# Patient Record
Sex: FEMALE | Race: BLACK OR AFRICAN AMERICAN | NOT HISPANIC OR LATINO | Employment: OTHER | RURAL
[De-identification: names, ages, dates, MRNs, and addresses within clinical notes are randomized per-mention and may not be internally consistent; named-entity substitution may affect disease eponyms.]

---

## 2021-03-22 DIAGNOSIS — M48.061 LUMBAR STENOSIS: Primary | ICD-10-CM

## 2021-03-30 DIAGNOSIS — M54.9 DORSALGIA, UNSPECIFIED: ICD-10-CM

## 2021-04-13 DIAGNOSIS — M54.9 DORSALGIA, UNSPECIFIED: ICD-10-CM

## 2021-04-13 RX ORDER — METHYLPREDNISOLONE 4 MG/1
TABLET ORAL
COMMUNITY
Start: 2021-03-22

## 2021-04-13 RX ORDER — CLONIDINE HYDROCHLORIDE 0.1 MG/1
0.1 TABLET ORAL 2 TIMES DAILY
COMMUNITY
Start: 2021-01-12

## 2021-04-13 RX ORDER — CALCIUM CITRATE/VITAMIN D3 200MG-6.25
TABLET ORAL
COMMUNITY
Start: 2021-04-01

## 2021-04-13 RX ORDER — LINACLOTIDE 145 UG/1
145 CAPSULE, GELATIN COATED ORAL DAILY
COMMUNITY
Start: 2021-03-07

## 2021-04-13 RX ORDER — INSULIN GLARGINE 100 [IU]/ML
INJECTION, SOLUTION SUBCUTANEOUS
COMMUNITY
Start: 2021-02-24

## 2021-04-13 RX ORDER — AMLODIPINE BESYLATE 10 MG/1
10 TABLET ORAL DAILY
COMMUNITY
Start: 2021-02-02

## 2021-04-13 RX ORDER — LOSARTAN POTASSIUM 100 MG/1
100 TABLET ORAL DAILY
COMMUNITY
Start: 2021-02-23

## 2021-04-13 RX ORDER — ONDANSETRON 4 MG/1
4 TABLET, FILM COATED ORAL 2 TIMES DAILY
COMMUNITY
Start: 2021-01-08

## 2021-04-13 RX ORDER — SYRINGE,SAFETY WITH NEEDLE,1ML 25GX1"
SYRINGE (EA) MISCELLANEOUS
COMMUNITY
Start: 2021-02-23

## 2021-04-13 RX ORDER — HYDROCODONE BITARTRATE AND ACETAMINOPHEN 10; 325 MG/1; MG/1
TABLET ORAL
COMMUNITY
Start: 2021-04-07

## 2021-04-13 RX ORDER — NAPROXEN SODIUM 220 MG
TABLET ORAL
COMMUNITY
Start: 2021-01-08

## 2021-04-13 RX ORDER — INSULIN ASPART 100 [IU]/ML
INJECTION, SOLUTION INTRAVENOUS; SUBCUTANEOUS
COMMUNITY
Start: 2021-03-07

## 2021-04-13 RX ORDER — PREGABALIN 200 MG/1
200 CAPSULE ORAL 2 TIMES DAILY
COMMUNITY
Start: 2021-04-02

## 2021-04-13 RX ORDER — MORPHINE SULFATE 15 MG/1
TABLET, FILM COATED, EXTENDED RELEASE ORAL
COMMUNITY
Start: 2021-04-09

## 2021-04-13 RX ORDER — BUSPIRONE HYDROCHLORIDE 15 MG/1
15 TABLET ORAL 3 TIMES DAILY
COMMUNITY
Start: 2021-04-07

## 2021-04-13 RX ORDER — METOPROLOL TARTRATE 50 MG/1
50 TABLET ORAL 2 TIMES DAILY
COMMUNITY
Start: 2021-03-01

## 2021-04-13 RX ORDER — TRIAMTERENE AND HYDROCHLOROTHIAZIDE 75; 50 MG/1; MG/1
1 TABLET ORAL DAILY
COMMUNITY
Start: 2021-04-07

## 2021-04-14 ENCOUNTER — OFFICE VISIT (OUTPATIENT)
Dept: SPINE | Facility: CLINIC | Age: 51
End: 2021-04-14
Payer: MEDICARE

## 2021-04-14 ENCOUNTER — HOSPITAL ENCOUNTER (OUTPATIENT)
Dept: RADIOLOGY | Facility: HOSPITAL | Age: 51
Discharge: HOME | End: 2021-04-14
Attending: ORTHOPAEDIC SURGERY
Payer: MEDICARE

## 2021-04-14 VITALS — WEIGHT: 280 LBS | BODY MASS INDEX: 51.53 KG/M2 | HEIGHT: 62 IN

## 2021-04-14 DIAGNOSIS — M54.12 CERVICAL RADICULOPATHY: ICD-10-CM

## 2021-04-14 DIAGNOSIS — M48.061 LUMBAR STENOSIS: ICD-10-CM

## 2021-04-14 DIAGNOSIS — M54.12 CERVICAL RADICULOPATHY: Primary | ICD-10-CM

## 2021-04-14 DIAGNOSIS — M54.9 DORSALGIA, UNSPECIFIED: ICD-10-CM

## 2021-04-14 PROCEDURE — 99204 PR OFFICE/OUTPT VISIT, NEW, LEVL IV, 45-59 MIN: ICD-10-PCS | Mod: S$PBB,,, | Performed by: ORTHOPAEDIC SURGERY

## 2021-04-14 PROCEDURE — 72050 X-RAY EXAM NECK SPINE 4/5VWS: CPT | Mod: TC

## 2021-04-14 PROCEDURE — 72110 X-RAY EXAM L-2 SPINE 4/>VWS: CPT | Mod: TC

## 2021-04-14 PROCEDURE — 72110 X-RAY EXAM L-2 SPINE 4/>VWS: CPT | Mod: 26,,, | Performed by: ORTHOPAEDIC SURGERY

## 2021-04-14 PROCEDURE — 99214 OFFICE O/P EST MOD 30 MIN: CPT | Mod: PBBFAC,25 | Performed by: ORTHOPAEDIC SURGERY

## 2021-04-14 PROCEDURE — 72050 X-RAY EXAM NECK SPINE 4/5VWS: CPT | Mod: 26,,, | Performed by: ORTHOPAEDIC SURGERY

## 2021-04-14 PROCEDURE — 72050 XR CERVICAL SPINE AP LAT WITH FLEX EXTEN: ICD-10-PCS | Mod: 26,,, | Performed by: ORTHOPAEDIC SURGERY

## 2021-04-14 PROCEDURE — 72110 XR LUMBAR SPINE 5 VIEW WITH FLEX AND EXT: ICD-10-PCS | Mod: 26,,, | Performed by: ORTHOPAEDIC SURGERY

## 2021-04-14 PROCEDURE — 99999 PR PBB SHADOW E&M-EST. PATIENT-LVL IV: ICD-10-PCS | Mod: PBBFAC,,, | Performed by: ORTHOPAEDIC SURGERY

## 2021-04-14 PROCEDURE — 3008F PR BODY MASS INDEX (BMI) DOCUMENTED: ICD-10-PCS | Mod: CPTII,,, | Performed by: ORTHOPAEDIC SURGERY

## 2021-04-14 PROCEDURE — 3008F BODY MASS INDEX DOCD: CPT | Mod: CPTII,,, | Performed by: ORTHOPAEDIC SURGERY

## 2021-04-14 PROCEDURE — 99204 OFFICE O/P NEW MOD 45 MIN: CPT | Mod: S$PBB,,, | Performed by: ORTHOPAEDIC SURGERY

## 2021-04-14 PROCEDURE — 99999 PR PBB SHADOW E&M-EST. PATIENT-LVL IV: CPT | Mod: PBBFAC,,, | Performed by: ORTHOPAEDIC SURGERY

## 2022-04-13 DIAGNOSIS — Z74.09 IMPAIRED MOBILITY: Primary | ICD-10-CM

## 2022-04-20 ENCOUNTER — CLINICAL SUPPORT (OUTPATIENT)
Dept: REHABILITATION | Facility: HOSPITAL | Age: 52
End: 2022-04-20
Payer: MEDICARE

## 2022-04-20 DIAGNOSIS — Z74.09 IMPAIRED MOBILITY: ICD-10-CM

## 2022-04-20 PROCEDURE — 97161 PT EVAL LOW COMPLEX 20 MIN: CPT | Mod: PN

## 2022-04-20 NOTE — PLAN OF CARE
[facility]  ASSISTIVE TECHNOLOGY EVALUATION    Date: 4/20/2022   Name: Madina Claudio  Clinic Number: [id number]    Therapy Diagnosis:   Encounter Diagnosis   Name Primary?    Impaired mobility      Physician: John Quintanilla DO    Physician Orders: power chair eval   Medical Diagnosis from Referral: impaired mobility  Evaluation Date: 4/20/2022  Visit # / Visits authorized: 1/ 1    Time In: 1438  Time Out: 1514   Total Appointment Time (timed & untimed codes): 26 minutes    Precautions: Standard    Past Medical History  diabetes, hypertension and chronic pain in back and neck.  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  She states she has difficulty walking since her neck surgery.     Pain at time of evaluation: 8/10 Severe  Pain at worst: 10/10 Worst Pain Imaginable  Pain at rest: 7/10 Severe    Factors that increase pain:   standing, sitting and walking    Factors that decrease pain:  lying down in a specific position, morphine, Norco, Lyrica, Buspar, and epidural injections per Dr. Srinivasan    Current Durable Medical Equipment  QC, ,     Home environment  She lives with family in a mobile home.  She has 5-6 steps with a rail to enter home.   Interior door width: ~28  Exterior door width: ~32  Comments:     Patient's mobility complaints: She is unable to ambulate for any short distances to severe back, right leg pain, and neck pain  She has SOB due to asthma with ambulation.  She has knee pain in bilateral knees and cannot ambulate due to knee pain as well.  She states she stays int he bed most days due to pain and numbness when sitting and standing.  She reports she has had several falls with few since 2015    Objective Evaluation:    ROM Right upper extremity  Left upper extremity   Right lower extremity  Left lower extremity    Shoulder flexion  95 95 Hip flexion 90 90   Shoulder IR/ER WFL WFL Hip extension  0 0   Shoulder ABD 90 90  Knee extension  0 0   Elbow flexion/ext WFL WFL Knee flexion  95 95   Wrist flexion/ext WFL WFL Ankle DF 5 5   Finger flexion/ext WFL WFL Ankle PF 20 20             Strength Right upper extremity  Left upper extremity   Right lower extremity  Left lower extremity    Shoulder flex +3/5 +3/5 Hip flexion  +3/5 +3/5   Shoulder IR/ER +3/5 +3/5 Hip extension  +3/5 +3/5   Shoulder ABD +3/5 +3/5 Knee extension  +3/5 +3/5   Elbow flexion/ext +3/5 +3/5 Knee flexion  +3/5 +3/5   Wrist flexion/ext +3/5 +3/5 Ankle DF +3/5 +3/5   Finger flexion/ext +3/5 +3/5 Ankle PF +3/5 +3/5             Sensation: WFL    History of pressure sores: none    Transfers:  Sit to/from Stand  Independent with 2 attempts  Stand Pivot Transfer Independent  Supine to/from Sit Independent    Cognitive Status: WFL    Activities of Daily Living:  Feeding: Independent  Dressing: Independent  Bathing: Independent  Toileting: Independent    Balance Assessment:  Static Sit Independent  Dynamic Sit Independent  Static Stand Independent  Dynamic Stand Supervision or Set-up Assistance    Postural Assessment:  Head and Neck: WFL  Upper Extremities: WFL  Trunk: WFL  Pelvis: WFL  Lower Extremities: WFL    Gait Analysis: TUG test in 22 seconds with antalgic gait patter with QC    Body Measurements(all in inches):  Seat to top of head 30   Hip width 20   Chest width 17   Shoulder width 20   Outer knee 17   Inner knee NT     Right  Left  Shoulder Height 24 24   Axilla to seat 16 16   Elbow to seat 7 7   Thigh Length 18.5 18.5   Lower leg length 18.5 18.5       Nutrition: WFL  Weight loss/gain in past 2 months: yes  Difficulty swallowing: yes    Assessment and Recommendations:    A walker will not meet this patient's mobility needs secondary to She reports her back pain is still unbearable with the use of a RW.  She has a RW and a QC and is unable to complete her household activities with them due to pain with an AD.  .    A manual wheelchair will not meet this patient's  mobility needs because She weighs 300 lbs and does not have the UE strength to be able to complete household mobility with self propulsion of a MWC.    A power wheelchair is needed to meet this patient's mobility needs because: she will be able to complete household mobility independently with less risk of fall and less unbearable back and LE pain.  I do not recommend a POV due to her body weight is 300 lbs and there is not a POV that will accommodate her body weight for daily use.  .    The wheelchair recommended is: Group 2 HD PWC with right side joystick and captain's seating for safe transfers.     Electronically signed by:  TODD GUPTA PT, ATP  04/20/2022    I CERTIFY THE NEED FOR THESE SERVICES FURNISHED UNDER THIS PLAN OF TREATMENT AND WHILE UNDER MY CARE.    Physician's comments:      Physician's Signature: _________________________________________  Date: __________________________